# Patient Record
Sex: MALE | Race: WHITE
[De-identification: names, ages, dates, MRNs, and addresses within clinical notes are randomized per-mention and may not be internally consistent; named-entity substitution may affect disease eponyms.]

---

## 2017-05-16 NOTE — CARD
--------------- APPROVED REPORT --------------





EXAM: Two-dimensional and M-mode echocardiogram with Doppler and color Doppler.



Other Information 

Quality : Average

Rhythm : NSR



INDICATION

Chest Pain 



2D DIMENSIONS 

Left Atrium(2D)3.6 (1.6-4.0cm)IVSd1.2 (0.7-1.1cm)

Aortic Root(2D)2.7 (2.0-3.7cm)LVDd4.1 (3.9-5.9cm)

LVOT Diameter2.1 (1.8-2.4cm)PWd1.2 (0.7-1.1cm)

LVDs2.7 (2.5-4.0cm)FS (%) 34.2 %

SV47.5 mlLVEF(%)63.7 (>50%)



Aortic Valve

AoV Peak Richi.172.4cm/sAoV VTI35.2cm

AO Peak GR.11.9mmHgLVOT  VTI 23.27cm

AO Mean GR.8mmHg



Mitral Valve

MV E Dnvyctkv41.4cm/sMV DECEL [id number]

MV A Ytxkixzm962.6cm/sE/A  Ratio0.9

MV A [id number]



TDI

Lateral E' P. V6.44cm/sMedial E' P. V6.50cm/s

E/Lateral E'15.0E/Medial E'14.8



Tricuspid Valve

TR P. Dzlcmpjl507yo/sRAP [id number]

TR Peak Gr.24lwFyUMAX72wkQc



 LEFT VENTRICLE 

The left ventricle is normal size. There is borderline concentric left ventricular hypertrophy. Left 
ventricle systolic function is normal. The Ejection Fraction is 55-60%. There is grossly normal LV se
gmental wall motion. Tissue Doppler imaging reveals mild left ventricular diastolic dysfunction. 



 RIGHT VENTRICLE 

The right ventricle is normal size. The right ventricular systolic function is normal.



 ATRIA 

The left atrium size is normal. The right atrium size is normal. The interatrial septum is intact wit
h no evidence for an atrial septal defect or patent foramen ovale as noted on 2-D or Doppler imaging.




 AORTIC VALVE 

The aortic valve is mildly calcified and not well visualized. Doppler and Color Flow revealed no sign
ificant aortic regurgitation. There is no significant aortic valvular stenosis.



 MITRAL VALVE 

The mitral valve is normal in structure and function. There is no mitral valve stenosis. Doppler and 
Color Flow revealed trace mitral regurgitation.



 TRICUSPID VALVE 

The tricuspid valve is normal in structure and function. Doppler and Color Flow revealed trace tricus
pid regurgitation. The PA pressure was estimated at 19 mmHg. There is no tricuspid valve stenosis.



 PULMONIC VALVE 

The pulmonic valve is not well visualized. Doppler and Color Flow revealed no pulmonic valvular regur
gitation. There is no pulmonic valvular stenosis.



 GREAT VESSELS 

The aortic root is normal in size. The IVC is normal in size and collapses >50% with inspiration.



 PERICARDIAL EFFUSION 

There is no evidence of significant pericardial effusion.



Critical Notification

Critical Value: No



<Conclusion>

Left ventricle systolic function is normal. The Ejection Fraction is 55-60%.

There is grossly normal LV segmental wall motion.

No significant valvular disease. 

Technically difficult study

## 2017-05-16 NOTE — HP
ADMIT DATE:  05/16/2017



CHIEF COMPLAINT AND HISTORY OF PRESENT ILLNESS:  This is a 76-year-old white

male who is well known to me from followup in the office.  The patient was seen

in the office on the day of admission with a couple of weeks of chest pain that

was exertional in nature, relieved by rest.  He had been short of breath that

was on a couple of occasions, but no other associated symptoms such as

diaphoresis, nausea, or lightheadedness.  On the day of admission, he has worked

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

quite resolved like it had been and upon seeing him in the office, he was still

having some chest discomfort.  Sublingual nitroglycerin relieved it very

quickly, and it was elected to admit him to the hospital for further workup of

his chest pain.



PAST MEDICAL HISTORY:  Remarkable for diabetes mellitus, hyperlipidemia,

Waldenstrom macroglobulinemia.  He has had a prior rotator cuff repair.



MEDICATIONS:  Brought with the patient, listed on the computer and have been

addressed.



ALLERGIES:  He has no known drug allergies.



SOCIAL HISTORY:  He is a nonsmoker, social drinker, manages a hardware store

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  He is , lives at home with his wife.  He

does not use drugs.



FAMILY HISTORY:  Noncontributory .



REVIEW OF SYSTEMS:  As mentioned above.



PHYSICAL EXAMINATION:

GENERAL:  He is a well-developed, well-nourished, white male in no acute

distress.

VITAL SIGNS:  Stable.  He is afebrile.

HEAD, EYES, EARS, NOSE, AND THROAT:  Remarkable for glasses.

NECK:  Supple without bruit or thyromegaly.

CHEST:  Clear to auscultation and percussion.

HEART:  Regular rate and rhythm.  No S3, S4, or murmur.

ABDOMEN:  Soft, nontender without hepatosplenomegaly or mass.

EXTREMITIES:  Without cyanosis, clubbing, or edema.

NEUROLOGIC:  He is intact.



IMPRESSION:

1.  Chest pain suspicious for incidents above.

2.  Other problems above.



PLAN:  The patient has been admitted.  Labs including troponin will be checked. 

Cardiology will be asked to see him.  EKG will be obtained.  Sublingual

nitroglycerin will be given p.r.n.  IV access will be obtained and the patient

will be monitored, managed, and treated appropriately.

 



______________________________

DARIN TARIQ MD



DR:  Ashley  JOB#:  [id number] / [id number]

DD:  05/16/2017 16:34  DT:  05/16/2017 19:02

## 2017-05-16 NOTE — PDOC2
KAYLA MAE APRN 5/16/17 1637:


CARDIAC CONSULT


DATE OF CONSULT


Date of Consult


DATE: 5/16/17 


TIME: 16:19





REASON FOR CONSULT


Reason for Consult:


Chest pain





REFERRING PHYSICIAN


Referring Physician:


Appl





SOURCE


Source:  Chart review, Patient





HISTORY OF PRESENT ILLNESS


HISTORY OF PRESENT ILLNESS


This is a pleasant 75 yo male admitted for complains of chest pain.  Reports 

that in the last 2 weeks he has been having intermittent chest pain.  Reports 

that he has been quite active in the farm.  He used hand tiller 4-5 times in 

the last 2 weeks and 2 of the times he had retrosternal chest pressure with SOA 

with one of which lasted for about an hour.  Mainly his chest pain onset is 

with exertion. Denies any nausea or vomiting.  No radiating pain to arms and no 

diaphoresis. He saw his PCP today and actually was having tolerable chest 

discomfort at that time and was given NTG x1 and got better. Denies any 

palpitations, passing out. Denies any VTE, CAD in the past.  He actually had 

stress test and LHC 15 yrs ago and was told that his coronaries were normal.  

He has not had any cardiac testing since then.  Reports also of tripping on his 

ike yesterday hence his abrasion to both of his legs.  He has DM2 in which 

his last A1C was 7.2 but his BG in AM is normal.  He takes statin but no BP 

meds although he told me his readings are at high end. He does use aleve daily 

for his back pain but no symptoms of GERD. No prior hx of PUD or bleeding 

disorders





PAST MEDICAL HISTORY


Cardiovascular:  Hyperlipidemia


Pulmonary:  No pertinent hx


Heme/Onc:  Cancer (Waldenstrom lymphoma treated with chemotherapy 8 yrs ago)


Musculoskeletal:   low back pain, Osteoarthritis


Infectious disease:  No pertinent hx


ENT:  Other (cataract)


Renal/:  No pertinent hx


Endocrine:  Diabetes (2)


Dermatology:  No pertinent hx





PAST SURGICAL HISTORY


Past Surgical History:  Arthroscopy (RTC repair bilateral ), Cataract Removal, 

Other (LMD)





SOCIAL HISTORY


Smoke:  No (remote)


ALCOHOL:  occassional


Drugs:  None


Lives:  with Family





ALLERGIES


ALLERGIES:  


Coded Allergies:  


     No Known Drug Allergies (Unverified , 7/15/15)





ROS


Review of System


14 point ROS evaluated with pertinent positives noted per HPI





PHYSICAL EXAM


General:  Alert, Oriented X3, Cooperative, No acute distress


HEENT:  Atraumatic, Mucous membr. moist/pink


Lungs:  Clear to auscultation, Normal air movement


Heart:  Regular rate (SR), Normal S1, Normal S2, Other (2/6 systolic murmur to 

LLS border)


Abdomen:  Soft, No tenderness


Extremities:  No cyanosis, Other (1-2+ bilateral LE pitting edema)


Skin:  No breakdown, Other (bilateral anterior calf abrasion from fall)


Neuro:  Normal speech, Sensation intact


Psych/Mental Status:  Mental status NL, Mood NL


MUSCULOSKELETAL:  Osteoarthritic changes both hands





VITALS


VITALS





Vital Signs








  Date Time  Temp Pulse Resp B/P (MAP) Pulse Ox O2 Delivery O2 Flow Rate FiO2


 


5/16/17 15:21 98.5 86 18 140/66 (90) 96 Room Air  





 98.5       











ASSESSMENT/PLAN


ASSESSMENT/PLAN


1. Chest pain: possible ACS. 


2. DM2: Last A1C 7.2. 


3. HLP: on statin at home


4. Hx of Waldenstrom lymphoma: treated with chemo about 8 yrs ago per pt. 


5. Mechanical fall: Yesterday. small abrasion to bilateral LE, no traumatic 

injury otherwise





Recommendations


1. Multiple labs pending including TSH, lipid panel, Troponin to trend


2. ASA. anticoagulation will be considered pending lab results


3. TTE, EKG, CXR


4. Ischemic workup tomorrow. NPO after midnight.


Problems:  





MAINE COSTA MD 5/16/17 1712:


CARDIAC CONSULT


ALLERGIES


ALLERGIES:  


Coded Allergies:  


     No Known Drug Allergies (Unverified , 7/15/15)





ASSESSMENT/PLAN


ASSESSMENT/PLAN


Patient seen and examined. Agree with above nurse practitioner note.


76-year-old gentleman presenting with exertional angina that is relieved with 

nitroglycerin.


On examination he has normal heart sounds. No significant edema was present.


Labs pending.


We will likely plan on coronary angiography tomorrow. Risks and benefits 

discussed with the patient.


Problems:  











KAYLA MAE May 16, 2017 16:37


MAINE COSTA MD May 16, 2017 17:12

## 2017-05-17 NOTE — EKG
[facility]

              [address] [place] [address]

Test Date:    2017               Test Time:    08:40:02

Pat Name:     BERNABE PATEL          Department:   

Patient ID:   [id number]           Room:         208 1

Gender:       M                        Technician:   ANGELINE

:          1940               Requested By: DARIN TARIQ

Order Number: [id number]            Reading MD:   Walter Ramírez

                                 Measurements

Intervals                              Axis          

Rate:         83                       P:            -33

SD:           198                      QRS:          -39

QRSD:         100                      T:            114

QT:           374                                    

QTc:          445                                    

                           Interpretive Statements

SINUS RHYTHM

ATRIAL PREMATURE COMPLEX(ES)

ABNORMAL LEFT AXIS DEVIATION

LEFT ANTERIOR FASCICULAR BLOCK

CONSIDER LEFT VENTRICULAR HYPERTROPHY



Electronically Signed On 2017 9:13:13 CDT by Walter Ramírez

## 2017-05-17 NOTE — RAD
Chest, 2 views, 5/16/2017:



History: Chest pain



Comparison is made to a study from 3/20/2009. The heart size and pulmonary

vascularity are normal. There is calcific plaquing of the aorta. There is a

small nodular opacity projected over the left upper lobe. It is more prominent

than on previous radiographs, although that could be due to technical

factors.. A partially calcified nodule was seen in the left upper lobe on the

7/15/2015 CT study. No acute infiltrates are seen. There is no evidence of

pleural fluid. Moderate spurring is present in the spine.





IMPRESSION:

1. Possible enlarging left upper lobe pulmonary nodule. CT scanning is

suggested for further evaluation.

2. No acute cardiopulmonary abnormality is detected.

## 2017-05-17 NOTE — PDOC
GENERAL


General:


vss and afebrile. awake and alert. small amount of chest pain since admit. 

troponin negative and rest lab decent. echo noted and ok. heart cath for today 

with plans to follow.


Problems:  





VITAL SIGNS


Vital Signs:





Vital Signs








  Date Time  Temp Pulse Resp B/P (MAP) Pulse Ox O2 Delivery O2 Flow Rate FiO2


 


5/17/17 07:00 97.9 74 18 145/70 (95) 97 Room Air  





 97.9       











I & O


I & O











Intake and Output 


 


 5/17/17





 07:00


 


Intake Total 670 ml


 


Output Total 500 ml


 


Balance 170 ml


 


 


 


Intake Oral 300 ml


 


IV Total 370 ml


 


Output Urine Total 500 ml











ALLERGIES


Allergies:





Allergies








Coded Allergies Type Severity Reaction Last Updated Verified


 


  No Known Drug Allergies    7/15/15 No











MEDS


Medications:





Current Medications








 Medications


  (Trade)  Dose


 Ordered  Sig/Mary  Start Time


 Stop Time Status Last Admin


Dose Admin


 


 Aspirin


  (Ecotrin)  325 mg  1X  ONCE  5/16/17 17:00


 5/16/17 17:01 DC 5/16/17 17:05


325 MG


 


 Docusate Sodium


  (Colace)  100 mg  DAILY  5/17/17 09:00


     


 


 


 Insulin Detemir


  (Levemir)  30 units  QHS  5/16/17 21:00


     


 


 


 Nitroglycerin


  (Nitrostat)  0.4 mg  PRN Q5MIN  PRN  5/16/17 16:15


     


 


 


 Simvastatin


  (Zocor)  40 mg  QHS  5/16/17 21:00


     


 


 


 Sodium Chloride  1,000 ml @ 


 75 mls/hr  1X  ONCE  5/16/17 23:55


 5/17/17 13:14  5/17/17 00:15


75 MLS/HR











LAB


Lab:





Laboratory Tests








Test


  5/16/17


16:10 5/16/17


17:00 5/16/17


17:18 5/16/17


20:48


 


White Blood Count


  4.9 x10^3/uL


(4.0-11.0) 


  


  


 


 


Red Blood Count


  4.07 x10^6/uL


(4.30-5.70) 


  


  


 


 


Hemoglobin


  12.7 g/dL


(13.0-17.5) 


  


  


 


 


Hematocrit


  37.6 %


(39.0-53.0) 


  


  


 


 


Mean Corpuscular Volume 92 fL ()    


 


Mean Corpuscular Hemoglobin 31 pg (25-35)    


 


Mean Corpuscular Hemoglobin


Concent 34 g/dL


(31-37) 


  


  


 


 


Red Cell Distribution Width


  14.7 %


(11.5-14.5) 


  


  


 


 


Platelet Count


  169 x10^3/uL


(140-400) 


  


  


 


 


Neutrophils (%) (Auto) 77 % (31-73)    


 


Lymphocytes (%) (Auto) 14 % (24-48)    


 


Monocytes (%) (Auto) 8 % (0-9)    


 


Eosinophils (%) (Auto) 1 % (0-3)    


 


Basophils (%) (Auto) 1 % (0-3)    


 


Neutrophils # (Auto)


  3.7 x10^3uL


(1.8-7.7) 


  


  


 


 


Lymphocytes # (Auto)


  0.7 x10^3/uL


(1.0-4.8) 


  


  


 


 


Monocytes # (Auto)


  0.4 x10^3/uL


(0.0-1.1) 


  


  


 


 


Eosinophils # (Auto)


  0.0 x10^3/uL


(0.0-0.7) 


  


  


 


 


Basophils # (Auto)


  0.0 x10^3/uL


(0.0-0.2) 


  


  


 


 


Sodium Level


  136 mmol/L


(136-145) 


  


  


 


 


Potassium Level


  4.1 mmol/L


(3.5-5.1) 


  


  


 


 


Chloride Level


  103 mmol/L


() 


  


  


 


 


Carbon Dioxide Level


  27 mmol/L


(21-32) 


  


  


 


 


Anion Gap 6 (6-14)    


 


Blood Urea Nitrogen


  22 mg/dL


(8-26) 


  


  


 


 


Creatinine


  1.1 mg/dL


(0.7-1.3) 


  


  


 


 


Estimated GFR


(Cockcroft-Gault) 65.1 


  


  


  


 


 


BUN/Creatinine Ratio 20 (6-20)    


 


Glucose Level


  186 mg/dL


(70-99) 


  


  


 


 


Calcium Level


  10.5 mg/dL


(8.5-10.1) 


  


  


 


 


Total Bilirubin


  0.4 mg/dL


(0.2-1.0) 


  


  


 


 


Aspartate Amino Transf


(AST/SGOT) 18 U/L (15-37) 


  


  


  


 


 


Alanine Aminotransferase


(ALT/SGPT) 18 U/L (16-63) 


  


  


  


 


 


Alkaline Phosphatase


  82 U/L


() 


  


  


 


 


Troponin I Quantitative


  < 0.017 ng/mL


(0.000-0.055) 


  


  


 


 


Total Protein


  7.5 g/dL


(6.4-8.2) 


  


  


 


 


Albumin


  3.3 g/dL


(3.4-5.0) 


  


  


 


 


Albumin/Globulin Ratio 0.8 (1.0-1.7)    


 


Thyroid Stimulating Hormone


(TSH) 2.884 uIU/mL


(0.358-3.74) 


  


  


 


 


Urine Collection Type  Unknown   


 


Urine Color  Yellow   


 


Urine Clarity  Clear   


 


Urine pH  5.5   


 


Urine Specific Gravity  1.015   


 


Urine Protein


  


  Negative mg/dL


(NEG-TRACE) 


  


 


 


Urine Glucose (UA)


  


  100 mg/dL


(NEG) 


  


 


 


Urine Ketones (Stick)


  


  Negative mg/dL


(NEG) 


  


 


 


Urine Blood  Negative (NEG)   


 


Urine Nitrite  Negative (NEG)   


 


Urine Bilirubin  Negative (NEG)   


 


Urine Urobilinogen Dipstick


  


  1.0 mg/dL (0.2


mg/dL) 


  


 


 


Urine Leukocyte Esterase  Negative (NEG)   


 


Urine RBC  0 /HPF (0-2)   


 


Urine WBC  1-4 /HPF (0-4)   


 


Urine Bacteria  0 /HPF (0-FEW)   


 


Urine Mucus  Mod /LPF   


 


Glucose (Fingerstick)


  


  


  130 mg/dL


(70-99) 129 mg/dL


(70-99)


 


Test


  5/16/17


22:45 5/17/17


05:50 


  


 


 


Troponin I Quantitative


  < 0.017 ng/mL


(0.000-0.055) 


  


  


 


 


Sodium Level


  


  140 mmol/L


(136-145) 


  


 


 


Potassium Level


  


  4.3 mmol/L


(3.5-5.1) 


  


 


 


Chloride Level


  


  106 mmol/L


() 


  


 


 


Carbon Dioxide Level


  


  25 mmol/L


(21-32) 


  


 


 


Anion Gap  9 (6-14)   


 


Blood Urea Nitrogen


  


  18 mg/dL


(8-26) 


  


 


 


Creatinine


  


  0.9 mg/dL


(0.7-1.3) 


  


 


 


Estimated GFR


(Cockcroft-Gault) 


  82.0 


  


  


 


 


Glucose Level


  


  63 mg/dL


(70-99) 


  


 


 


Calcium Level


  


  10.3 mg/dL


(8.5-10.1) 


  


 


 


Magnesium Level


  


  2.1 mg/dL


(1.8-2.4) 


  


 


 


Triglycerides Level


  


  70 mg/dL


(0-150) 


  


 


 


Cholesterol Level


  


  150 mg/dL


(0-200) 


  


 


 


LDL Cholesterol, Calculated


  


  101 mg/dL


(0-100) 


  


 


 


VLDL Cholesterol, Calculated


  


  14 mg/dL


(0-40) 


  


 


 


Non-HDL Cholesterol Calculated


  


  115 mg/dL


(0-129) 


  


 


 


HDL Cholesterol


  


  35 mg/dL


(40-60) 


  


 


 


Cholesterol/HDL Ratio  4.3   

















DARIN TARIQ MD May 17, 2017 08:32

## 2017-05-18 VITALS
SYSTOLIC BLOOD PRESSURE: 158 MMHG | DIASTOLIC BLOOD PRESSURE: 76 MMHG | SYSTOLIC BLOOD PRESSURE: 158 MMHG | DIASTOLIC BLOOD PRESSURE: 76 MMHG

## 2017-05-18 NOTE — DS
DATE OF DISCHARGE:  05/18/2017



PRIMARY DIAGNOSIS:  New-onset angina pectoralis.



ADDITIONAL DIAGNOSES:  Coronary artery disease with stenting of LAD lesion,

longstanding diabetes mellitus, Waldenstrom macroglobulinemia, and hypertension.



CHIEF COMPLAINT AND HISTORY OF PRESENT ILLNESS:  This 76-year-old white male is

well known to me from followup in the office.  He was seen on the day of

admission with a couple of weeks of just not feeling the best and some

exertional chest pain.  There had been a couple of episodes of this that had

been associated with shortness of breath but no other symptoms.  He received

relief with sublingual nitroglycerin in the office on the day of admission for

chest discomfort that had been persistent at that point in time for several

hours.  He was admitted to the hospital for the same.



SUMMARY OF STAY:  The patient was admitted.  Troponin was negative.  Rest of lab

was essentially unremarkable.  He was taken for cardiac catheterization on the

morning of 05/15/2017.  He had an LAD stents placed with a critical lesion

there.  Post-stent, the patient felt much better, better energy that he had in

some time and the chest discomfort he was having was gone.  Brilinta and

lisinopril were added to his regimen by Cardiology with them feeling.  He was

ready for discharge then on 05/18/2017 and this was accomplished.



DISPOSITION:  The patient is discharged to home.  He is discharged on ADA diet. 

Activity as tolerated, office in 1 week.



DISCHARGE MEDICATIONS:  Listed on the medical record and have been addressed.

 



______________________________

DARIN TARIQ MD



DR:  CRISTIAN/darinel  JOB#:  [id number] / [id number]

DD:  05/18/2017 15:05  DT:  05/18/2017 19:35

## 2017-05-18 NOTE — PDOC
GENERAL


General:


see discharge summary.


Problems:  





VITAL SIGNS


Vital Signs:





Vital Signs








  Date Time  Temp Pulse Resp B/P (MAP) Pulse Ox O2 Delivery O2 Flow Rate FiO2


 


5/18/17 12:35  90  158/76    


 


5/18/17 11:00 98.1  18  95 Room Air  





 98.1       


 


5/17/17 12:29       2.0 











I & O


I & O











Intake and Output 


 


 5/18/17





 07:00


 


Intake Total 1200 ml


 


Output Total 2975 ml


 


Balance -1775 ml


 


 


 


Intake Oral 1200 ml


 


Output Urine Total 2975 ml











ALLERGIES


Allergies:





Allergies








Coded Allergies Type Severity Reaction Last Updated Verified


 


  No Known Drug Allergies    7/15/15 No











MEDS


Medications:





Current Medications








 Medications


  (Trade)  Dose


 Ordered  Sig/Mary  Start Time


 Stop Time Status Last Admin


Dose Admin


 


 Aspirin


  (Ecotrin)  325 mg  1X  ONCE  5/16/17 17:00


 5/16/17 17:01 DC 5/16/17 17:05


325 MG


 


 Docusate Sodium


  (Colace)  100 mg  DAILY  5/17/17 09:00


 5/18/17 14:25 DC 5/18/17 09:13


100 MG


 


 Fentanyl Citrate


  (Fentanyl 2ml


 Vial)  100 mcg  1X  ONCE  5/17/17 11:45


 5/17/17 11:46 DC 5/17/17 12:29


50 MCG


 


 Heparin Sodium


  (Porcine)


  (Heparin Sodium)  4,000 unit  1X  ONCE  5/17/17 11:48


 5/17/17 12:10 DC 5/17/17 12:31


4,000 UNIT


 


 Heparin Sodium/


 Sodium Chloride  1,000 unit  1X  ONCE  5/17/17 11:45


 5/17/17 11:46 DC 5/17/17 12:27


1,000 UNIT


 


 Insulin Detemir


  (Levemir)  30 units  QHS  5/16/17 21:00


 5/18/17 14:25 DC 5/17/17 20:51


30 UNITS


 


 Iohexol


  (Omnipaque 300


 Mg/ml)  100 ml  1X  ONCE  5/17/17 11:45


 5/17/17 11:46 DC 5/17/17 12:30


138 ML


 


 Iohexol


  (Omnipaque 350


 Mg/ml)  100 ml  STK-MED ONCE  5/17/17 11:11


 5/17/17 11:12 DC  


 


 


 Lidocaine HCl  20 ml  1X  ONCE  5/17/17 11:45


 5/17/17 11:46 DC 5/17/17 12:28


2 ML


 


 Lisinopril


  (Prinivil)  10 mg  DAILY  5/18/17 12:00


 5/18/17 14:25 DC 5/18/17 12:35


10 MG


 


 Midazolam HCl


  (Versed)  2 mg  1X  ONCE  5/17/17 11:45


 5/17/17 11:46 DC 5/17/17 12:29


2 MG


 


 Nitroglycerin


  (Nitroglycerin)  200 mcg  1X  ONCE  5/17/17 12:00


 5/17/17 12:10 DC 5/17/17 12:29


200 MCG


 


 Nitroglycerin


  (Nitrostat)  0.4 mg  PRN Q5MIN  PRN  5/16/17 16:15


 5/18/17 14:25 DC  


 


 


 Simvastatin


  (Zocor)  40 mg  QHS  5/16/17 21:00


 5/18/17 14:25 DC 5/17/17 20:43


40 MG


 


 Sodium Chloride  1,000 ml @ 


 75 mls/hr  1X  ONCE  5/16/17 23:55


 5/17/17 13:14 DC 5/17/17 00:15


75 MLS/HR


 


 Ticagrelor


  (Brilinta)  90 mg  BID  5/18/17 10:00


 5/18/17 14:25 DC 5/18/17 09:53


90 MG


 


 Tirofiban/Sodium


 Chloride  100 ml @ 0


 mls/hr  1X  ONCE  5/17/17 11:57


 5/17/17 12:10 DC 5/17/17 12:32


18.5 MLS/HR


 


 Verapamil HCl


  (Verapamil)  2.5 mg  1X  ONCE  5/17/17 11:45


 5/17/17 11:46 DC 5/17/17 12:28


2.5 MG











LAB


Lab:





Laboratory Tests








Test


  5/17/17


16:49 5/17/17


20:42 5/18/17


08:05 5/18/17


10:39


 


Glucose (Fingerstick)


  139 mg/dL


(70-99) 212 mg/dL


(70-99) 112 mg/dL


(70-99) 169 mg/dL


(70-99)

















DARIN TARIQ MD May 18, 2017 15:02

## 2017-05-18 NOTE — CARD
--------------- APPROVED REPORT --------------





The patient was brought electively to the cardiac catheterization lab.  A timeout was performed confi
rming the patient's name, date of birth, procedure, and site of procedure.  All necessary personnel w
ere wearing the appropriate protective equipment and radiation monitor devices.  After explaining the
 risks and benefits of the procedure and alternatives, informed consent was obtained. (See nursing no
darrin for medications administered).  The right wrist was sterilely prepped and draped in the usual fas
hion.  The right wrist was infiltrated with 1 mL of 2% lidocaine for subcutaneous anesthesia.  A 6 Fr
ench Terumo glide sheath was inserted into the right radial artery without difficulty.  Right and lef
t coronary angiography was performed using a 6Fr TIG 4.0 catheter.  Left ventricular end diastolic pr
essure was obtained with a pigtail catheter and pullback was performed after left ventriculography.  
All catheter exchanges and advancements were performed over a guidewire.  At case completion the [facility]
t radial sheath was removed and a Terumo radial band was applied with 13 ml of air.  The patient tole
rated the procedure well and there were no immediate complications.



HEMODYNAMICS: 

LVEDP 19 mm Hg

No gradient on LV to aortic pullback. 



LEFT VENTRICULOGRAM: Deferred due to known EF of 55%



CORONARY ANGIOGRAPHY: 

LM is a large caliber vessel with normal angiographic appearance. 

LAD is a large caliber diffusely calcified vessel with a proximal 40% stenosis, followed by a mid 70%
 stenosis. 

D1 is a small caliber vessel with normal angiographic appearance. 

LCx is a moderate caliber dominant vessel with normal angiographic appearance. 

OM1 is a moderate caliber vessel with normal angiographic appearance. 

LPDA is a small to moderate caliber vessel with normal angiographic appearance. 

RCA is a small caliber caliber vessel with normal angiographic appearance.



INTERVENTIONAL TECHNIQUE: 

Heparin and tirofiban was used for antiocagulation. Through a 6F EBU 3.5 guide catheter, a 0.014'' Vo
lcano Verrata pressure wire was advanced to the distal LAD. An iFR was measured at 0.81 (Positive). T
herefore, the lesion was then ballooned with a Trek 2.5/12 at 14 edgar then stented with a 3.25/18 Xien
ce ASHISH at 16 edgar. Post-PCI angiography revealed excellent stent expansion and GÉNESIS 3 flow. The iFR re
peat measurement was normalized to 0.94. The patient was given Ticagrelor at case completion.



<Conclusion>

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 

Successful PCI of the mid LAD with implantation of a Xience 3.25/18 ASHISH.

ASA + Ticagrelor for 1 full year

High dose statin therapy and risk factor modification.

## 2018-08-31 NOTE — CARD
MR#: [id number]

Account#: [id number]

Accession#: [id number]

Date of Study: 08/31/2018

Ordering Physician: MAINE COSTA, 

Referring Physician: MAINE COSTA, 

Tech: BAILEY William





--------------- APPROVED REPORT --------------





EXAM: Two-dimensional and M-mode echocardiogram with Doppler and color Doppler.



Other Information 

Quality : AverageHR: 81bpm



INDICATION

Cardiac Disease: CAD 



2D DIMENSIONS 

Left Atrium(2D)3.0 (1.6-4.0cm)IVSd1.2 (0.7-1.1cm)

Aortic Root(2D)3.0 (2.0-3.7cm)LVDd5.2 (3.9-5.9cm)

LVOT Diameter2.3 (1.8-2.4cm)PWd1.2 (0.7-1.1cm)

LVDs3.4 (2.5-4.0cm)FS (%) 34.8 %

SV81.3 mlLVEF(%)63.6 (>50%)



Aortic Valve

AoV Peak Richi.175.4cm/sAoV VTI42.7cm

AO Peak GR.12.3mmHgLVOT Peak Richi.86.9cm/s

LVOT  VTI 18.98cmAO Mean GR.8mmHg

FAIBO (VMAX)1.60gh4NBK   (VTI)1.87cm2



Mitral Valve

MV E Jxydsyfm38.6cm/sMV DECEL [id number]

MV A Dwskazyy909.9cm/sMV [id number]

E/A  Ratio0.9MVA (PHT)2.77cm2



TDI

E/Lateral E'10.5E/Medial E'10.4



Pulmonary Valve

PV Peak Xdzharpa045.9cm/sPV Peak Grad.5mmHg



Tricuspid Valve

TR P. Dttminoy281uw/sRAP IFQQELEF9mvDs

TR Peak Gr.27oyCyITQU28vfCk



 LEFT VENTRICLE 

The left ventricle is normal size. There is mild concentric left ventricular hypertrophy. The left ve
ntricular systolic function is normal and the ejection fraction is within normal range. Left ventricu
lar ejection fraction is 55-60% There is normal LV segmental wall motion. The left ventricular diasto
lic function and filling is normal for age.



 RIGHT VENTRICLE 

The right ventricle is normal size. The right ventricular systolic function is normal.



 ATRIA 

The left atrium size is normal. The right atrium size is normal. The interatrial septum is intact wit
h no evidence for an atrial septal defect or patent foramen ovale as noted on 2-D or Doppler imaging.




 AORTIC VALVE 

The aortic valve is mildly thickened but opens well. Doppler and Color Flow revealed no significant a
ortic regurgitation. There is no significant aortic valvular stenosis. There is no aortic valvular ve
getation.



 MITRAL VALVE 

The mitral valve is thickened but opens well. There is no evidence of mitral valve prolapse. There is
 no mitral valve stenosis. Doppler and Color Flow revealed no mitral valve regurgitation noted.



 TRICUSPID VALVE 

The tricuspid valve leaflets are thickened , but open well. Doppler and Color Flow revealed trace to 
mild tricuspid regurgitation. There is no tricuspid valve prolapse or vegetation. There is no tricusp
id valve stenosis.



 PULMONIC VALVE 

The pulmonic valve is not well visualized. Doppler and Color Flow revealed no pulmonic valvular regur
gitation. There is no pulmonic valvular stenosis.



 GREAT VESSELS 

The aortic root is normal in size. The IVC is normal in size and collapses >50% with inspiration.



 PERICARDIAL EFFUSION 

There is no pleural effusion. There is no evidence of significant pericardial effusion.



Critical Notification

Critical Value: No



<Conclusion>

The left ventricle is normal size.

The left ventricular systolic function is normal and the ejection fraction is within normal range.

Left ventricular ejection fraction is 55-60%

There is mild concentric left ventricular hypertrophy.

There is no significant aortic valvular stenosis.

Doppler and Color Flow revealed no significant aortic regurgitation.

Doppler and Color Flow revealed no mitral valve regurgitation noted.

Doppler and Color Flow revealed trace to mild tricuspid regurgitation.



Signed by : David Ray MD

Electronically Approved : 08/31/2018 14:07:56

## 2021-04-02 ENCOUNTER — HOSPITAL ENCOUNTER (OUTPATIENT)
Dept: HOSPITAL 61 - US | Age: 81
End: 2021-04-02
Attending: INTERNAL MEDICINE
Payer: MEDICARE

## 2021-04-02 DIAGNOSIS — I65.23: ICD-10-CM

## 2021-04-02 DIAGNOSIS — R09.89: ICD-10-CM

## 2021-04-02 DIAGNOSIS — I08.0: Primary | ICD-10-CM

## 2021-04-02 DIAGNOSIS — I70.203: ICD-10-CM

## 2021-04-02 DIAGNOSIS — I25.10: ICD-10-CM

## 2021-04-02 PROCEDURE — 93306 TTE W/DOPPLER COMPLETE: CPT

## 2021-04-02 PROCEDURE — 93922 UPR/L XTREMITY ART 2 LEVELS: CPT

## 2021-04-02 PROCEDURE — 93970 EXTREMITY STUDY: CPT

## 2021-04-02 PROCEDURE — 93880 EXTRACRANIAL BILAT STUDY: CPT

## 2021-04-02 PROCEDURE — 93925 LOWER EXTREMITY STUDY: CPT

## 2021-04-02 NOTE — RAD
MR#: [id number]

Account#: [id number]

Accession#: [id number]

Date of Study: 04/02/2021

Ordering Physician: MAINE COSTA,

Referring Physician: MAINE COSTA,

Tech: 





--------------- APPROVED REPORT --------------





Patient Location: OUT-PATIENT

Laterality:Bilateral



Indications

Bruit



Risk Factors

Hypertension: 

Diabetes



Doppler Spectral Velocity Analysis

Right   Left    

pCCA     85/12 cm/spCCA     74/13 cm/s

mCCA     92/17 cm/smCCA     83/16 cm/s

dCCA     88/17 cm/sdCCA     120/17 cm/s

ECA      81/ cm/sECA      107/ cm/s

pICA     65/9 cm/spICA     108/16 cm/s

Sirisha     64/15 cm/smICA     132/16 cm/s

dICA     63/12 cm/sdICA     110/26 cm/s

ICA/CCA  0.71ICA/CCA  1.78



Findings

Grayscale images of the bilateral common carotid vessels demonstrate moderate diffuse plaque.



Spectral waveforms on the right internal carotid artery demonstrate normal velocities and waveforms. 
 Normal ICA to CCA ratios.  Overall 0 to less than 50% stenosis



On the left side there is likely moderate stenosis involving the mid internal carotid artery although
 the diastolic velocities are fairly low suggestive overall of 0 to less than 50% stenosis.  ICA to C
CA ratios are also within normal limits.



Bilateral vertebral velocities are antegrade and within normal limits.



Critical Notification

Critical Value: No



<Conclusion>

1.  No significant bilateral carotid occlusive disease



Signed by : Maine Costa, 

Electronically Approved : 04/02/2021 11:02:41

## 2021-04-02 NOTE — RAD
MR#: [id number]

Account#: [id number]

Accession#: [id number]

Date of Study: 04/02/2021

Ordering Physician: MAINE COSTA, 

Referring Physician: MAINE COSTA, 

Tech: 





--------------- APPROVED REPORT --------------





Patient Location: OUT-PATIENT

Exam Type: Ankle to Brachial Index



Indications

Claudication:                         



Risk Factors

Hypertension

Diabetes                        



Pressures/Indices

                RightABI                LeftABI

Brachial        799mvOl0.1Brachial        241gmKo0.2

Ankle(PT)       167mmHgAnkle(PT)       175mmHg

Ankle(DP)       167mmHgAnkle(DP)       168mmHg



Findings

Normal bilateral ROGELIO



Critical Notification

Critical Value: No



<Conclusion>

1. Normal bilateral ROGELIO as noted above. 



Signed by : Maine Costa, 

Electronically Approved : 04/02/2021 10:55:25

## 2021-04-02 NOTE — RAD
MR#: [id number]

Account#: [id number]

Accession#: [id number]

Date of Study: 04/02/2021

Ordering Physician: MAINE COSTA, 

Referring Physician: MAINE COSTA, 

Tech: 





--------------- APPROVED REPORT --------------





Patient Location : OUT-PATIENT



Indications

Lower Extremity Pain : 



Findings

Grayscale images of the bilateral saphenofemoral junctions are grossly unremarkable.  The right great
 saphenous vein measures 4.3 mm and the left great saphenous vein measures 3.8 mm.  Bilateral greater
 and lesser saphenous veins did not show any evidence of reflux.



Critical Notification

Critical Value: No



<Conclusion>

1.  Negative for reflux in the bilateral greater and lesser saphenous veins



Signed by : Maine Costa, 

Electronically Approved : 04/02/2021 10:56:27

## 2021-04-02 NOTE — CARD
MR#: [id number]

Account#: [id number]

Accession#: [id number]

Date of Study: 04/02/2021

Ordering Physician: WALTER COSTA, 

Referring Physician: WALTER COSTA, 

Tech: Apple [employer] [facility]





--------------- APPROVED REPORT --------------





EXAM: Two-dimensional and M-mode echocardiogram with Doppler and color Doppler.



Other Information 

Quality : AverageHR: 77bpm

Rhythm : NSR



INDICATION

Cardiac Disease: CAD 



RISK FACTORS

Hypertension 

Hyperlipidemia

Diabetes



2D DIMENSIONS 

RVDd2.7 (2.9-3.5cm)Left Atrium(2D)4.7 (1.6-4.0cm)

IVSd1.3 (0.7-1.1cm)LVDd4.6 (3.9-5.9cm)

PWd1.4 (0.7-1.1cm)LVDs2.6 (2.5-4.0cm)

FS (%) 44.8 %SV75.7 ml

LVEF(%)76.1 (>50%)



Aortic Valve

AoV Peak Richi.228.8cm/sAoV VTI53.5cm

AO Peak GR.20.9mmHgLVOT Peak Richi.105.1cm/s

AO Mean GR.12mmHg



Mitral Valve

MV E Wpewridg905.7cm/sMV DECEL [id number]

MV A Clrlxkbj956.6cm/sE/A  Ratio0.8



Pulmonary Valve

PV Peak Abqlvjog178.0cm/s



Tricuspid Valve

TR P. Wennytrj553vv/sTR Peak Gr.28mmHg



 LEFT VENTRICLE 

The left ventricle is normal size. There is mild to moderate concentric left ventricular hypertrophy.
 The left ventricular systolic function is normal and the ejection fraction is within normal range. E
stimated ejection fraction 55-60%. There is normal LV segmental wall motion. Transmitral Doppler flow
 pattern is Grade I-abnormal relaxation pattern.



 RIGHT VENTRICLE 

The right ventricle is normal size. There is normal right ventricular wall thickness. The right ventr
icular systolic function is normal.



 ATRIA 

The left atrium size is normal. The right atrium size is normal. The interatrial septum is intact wit
h no evidence for an atrial septal defect or patent foramen ovale as noted on 2-D or Doppler imaging.




 AORTIC VALVE 

The aortic valve is calcified with restricted leaflet motion. Doppler and Color Flow revealed no sign
ificant aortic regurgitation. There is no significant aortic valvular stenosis.



 MITRAL VALVE 

The mitral valve is normal in structure and function. There is no evidence of mitral valve prolapse. 
There is no mitral valve stenosis. Doppler and Color-flow revealed mild mitral regurgitation.



 TRICUSPID VALVE 

The tricuspid valve is normal in structure and function. Doppler and Color Flow revealed trace tricus
pid regurgitation. Estimated PAP 31 mmHg. There is no tricuspid valve stenosis.



 PULMONIC VALVE 

Doppler and Color Flow revealed trace pulmonic valvular regurgitation. There is no pulmonic valvular 
stenosis.



 GREAT VESSELS 

The aortic root is normal in size. The IVC is normal in size and collapses >50% with inspiration.



 PERICARDIAL EFFUSION 

There is no evidence of significant pericardial effusion.



Critical Notification

Critical Value: No



<Conclusion>

The left ventricular systolic function is normal and the ejection fraction is within normal range.  E
stimated ejection fraction 55-60%.

There is normal LV segmental wall motion.



Signed by : Walter Costa, 

Electronically Approved : 04/02/2021 15:48:45

## 2021-04-02 NOTE — RAD
MR#: [id number]

Account#: [id number]

Accession#: [id number]

Date of Study: 04/02/2021

Ordering Physician: MAINE COSTA, 

Referring Physician: MAINE COSTA, 

Tech: 





--------------- APPROVED REPORT --------------





Patient Location: OUT-PATIENT



Indications

Claudication:                         



Risk Factors

Hypertension

Diabetes                        



VELOCITY AND DOPPLER WAVEFORM ANALYSIS

RIGHT cm/secWaveformSeverity LEFT cm/secWaveform Severity 

pCFA 111.0BiphasicpCFA 94.0Biphasic

Prof Fem Art. 68.0BiphasicProf Fem Art. 63.0Biphasic

Fem Art Prox. 97.0BiphasicFem Art Prox. 86.0Biphasic

Fem Art Mid. 92.0BiphasicFem Art Mid. 86.0Biphasic

Fem Art Dist. 82.0BiphasicFem Art Dist. 118.0Biphasic

Pop Art(Fossa) 92.0BiphasicPop Art(AK) 85.0Biphasic

PTA Prox. 49.0BiphasicPTA Prox. 64.0Biphasic

PTA Dist. 83.0BiphasicPTA Dist. 76.0Biphasic

Per Art Dist.43.0BiphasicPer Art Dist.47.0Biphasic

FLETCHER Prox. 63.0BiphasicATA Prox. 61.0Biphasic

DPA 11MonophasicDPA 22Biphasic



Findings

Grayscale images of the bilateral lower extremity arterial vessels demonstrates mild to moderate diff
use atherosclerosis.  Overall waveforms are mostly biphasic except for the bilateral dorsalis pedis v
essels which demonstrate decreased velocities and likely moderate to severe diffuse disease.  There i
s otherwise three-vessel runoff below the knee.  No focal high-grade stenosis is identified.



Critical Notification

Critical Value: No



<Conclusion>

1.  No significant bilateral lower extremity arterial disease with three-vessel runoff bilaterally.



Signed by : Maine Costa, 

Electronically Approved : 04/02/2021 11:06:37

## 2022-04-08 ENCOUNTER — HOSPITAL ENCOUNTER (OUTPATIENT)
Dept: HOSPITAL 61 - ECHO | Age: 82
End: 2022-04-08
Attending: INTERNAL MEDICINE
Payer: MEDICARE

## 2022-04-08 DIAGNOSIS — I25.10: ICD-10-CM

## 2022-04-08 DIAGNOSIS — I08.0: Primary | ICD-10-CM

## 2022-04-08 PROCEDURE — 93306 TTE W/DOPPLER COMPLETE: CPT

## 2022-04-08 PROCEDURE — C8929 TTE W OR WO FOL WCON,DOPPLER: HCPCS

## 2022-04-08 NOTE — CARD
MR#: [id number]

Account#: [id number]

Accession#: [id number]

Date of Study: 04/08/2022

Ordering Physician: MAINE COSTA, 

Referring Physician: MAINE COSTA, 

Tech: Scooetr [employer]





--------------- APPROVED REPORT --------------





EXAM: Two-dimensional and M-mode echocardiogram with Doppler and color Doppler.



Other Information 

Quality : AverageHR: 70bpm

Rhythm : NSR



INDICATION

Cardiac Disease: CAD 



RISK FACTORS

Hyperlipidemia

Diabetes



2D DIMENSIONS 

Left Atrium(2D)5.1 (1.6-4.0cm)IVSd1.3 (0.7-1.1cm)

Aortic Root(2D)3.3 (2.0-3.7cm)LVDd4.3 (3.9-5.9cm)

LVOT Diameter2.1 (1.8-2.4cm)PWd1.2 (0.7-1.1cm)

LA Cxsppv71 (18-58mL)LVDs2.8 (2.5-4.0cm)

FS (%) 33.8 %SV51.4 ml

LVEF(%)63.0 (>50%)



Aortic Valve

AoV Peak Richi.204.1cm/sAoV VTI51.5cm

AO Peak GR.16.7mmHgLVOT Peak Richi.88.2cm/s

LVOT  VTI 20.89cmAO Mean GR.11mmHg

FABIO (VMAX)1.35nr0MRP   (VTI)1.39cm2



Mitral Valve

MV E Wxonoyzy62.0cm/sMV DECEL [id number]

MV A Jxelupim369.9cm/sMV HOP65we

E/A  Ratio0.8MVA (PHT)3.03cm2



TDI

E/Lateral E'13.5E/Medial E'13.5



Pulmonary Valve

PV Peak Xdqbwmjw231.2cm/sPV Peak Grad.4mmHg



Tricuspid Valve

TR P. Rgvfzgil664xu/sTR Peak Gr.27mmHg



Pulmonary Vein

S1 Weaskwhz41.4cm/sD2 Ektbyoil03.2cm/s



 LEFT VENTRICLE 

The left ventricle is normal size. There is mild concentric left ventricular hypertrophy. The left ve
ntricular systolic function is normal. The ejection fraction is estimated at 55 to 60%. There is norm
al LV segmental wall motion. Transmitral Doppler flow pattern is Grade I-abnormal relaxation pattern.
 No left ventricle thrombus noted on this study. There is no ventricular septal defect visualized. Th
ere is no left ventricular aneurysm. There is no mass noted in the left ventricle.



 RIGHT VENTRICLE 

The right ventricle is normal size. There is normal right ventricular wall thickness. The right ventr
icular systolic function is normal.



 ATRIA 

The left atrium is mildly dilated. The right atrium size is normal. The interatrial septum is intact 
with no evidence for an atrial septal defect or patent foramen ovale as noted on 2-D or Doppler imagi
ng.



 AORTIC VALVE 

The aortic valve is moderately calcified. Doppler and Color Flow revealed no significant aortic regur
gitation. There is no significant aortic valve stenosis. There is no aortic valvular vegetation.



 MITRAL VALVE 

The mitral valve is thickened but opens well. There is no evidence of mitral valve prolapse. There is
 no mitral valve stenosis. Doppler and Color-flow revealed mild mitral regurgitation.



 TRICUSPID VALVE 

The tricuspid valve is normal in structure and function. Doppler and Color Flow revealed trace tricus
pid regurgitation. There is no tricuspid valve prolapse or vegetation. There is no tricuspid valve st
enosis.



 PULMONIC VALVE 

The pulmonary valve is normal in structure and function. Doppler and Color Flow revealed no pulmonic 
valvular regurgitation. There is no pulmonic valvular stenosis.



 GREAT VESSELS 

The aortic root is normal in size. The ascending aorta is normal in size. The pulmonary artery is nor
mal. The IVC is normal in size and collapses >50% with inspiration.



 PERICARDIAL EFFUSION 

There is no pleural effusion. There is no evidence of significant pericardial effusion.



Critical Notification

Critical Value: No



<Conclusion>

The left ventricular systolic function is normal.

The ejection fraction is estimated at 55 to 60%.

There is normal LV segmental wall motion.

Transmitral Doppler flow pattern is Grade I-abnormal relaxation pattern.

Mild mitral regurgitation.

Trace tricuspid regurgitation.

There is no evidence of significant pericardial effusion.



Signed by : Nacho Mendez, 

Electronically Approved : 04/08/2022 18:11:07
